# Patient Record
Sex: FEMALE | Race: WHITE
[De-identification: names, ages, dates, MRNs, and addresses within clinical notes are randomized per-mention and may not be internally consistent; named-entity substitution may affect disease eponyms.]

---

## 2020-08-30 ENCOUNTER — HOSPITAL ENCOUNTER (INPATIENT)
Dept: HOSPITAL 74 - JER | Age: 65
LOS: 1 days | Discharge: HOME | DRG: 101 | End: 2020-08-31
Attending: GENERAL ACUTE CARE HOSPITAL | Admitting: INTERNAL MEDICINE
Payer: COMMERCIAL

## 2020-08-30 VITALS — BODY MASS INDEX: 22.6 KG/M2

## 2020-08-30 DIAGNOSIS — I10: ICD-10-CM

## 2020-08-30 DIAGNOSIS — F10.10: ICD-10-CM

## 2020-08-30 DIAGNOSIS — R56.9: Primary | ICD-10-CM

## 2020-08-30 DIAGNOSIS — R55: ICD-10-CM

## 2020-08-30 DIAGNOSIS — D72.829: ICD-10-CM

## 2020-08-30 DIAGNOSIS — E78.5: ICD-10-CM

## 2020-08-30 LAB
ALBUMIN SERPL-MCNC: 3.8 G/DL (ref 3.4–5)
ALP SERPL-CCNC: 54 U/L (ref 45–117)
ALT SERPL-CCNC: 24 U/L (ref 13–61)
AMPHET UR-MCNC: NEGATIVE NG/ML
ANION GAP SERPL CALC-SCNC: 9 MMOL/L (ref 8–16)
APPEARANCE UR: CLEAR
AST SERPL-CCNC: 18 U/L (ref 15–37)
BARBITURATES UR-MCNC: NEGATIVE NG/ML
BENZODIAZ UR SCN-MCNC: NEGATIVE NG/ML
BILIRUB SERPL-MCNC: 0.3 MG/DL (ref 0.2–1)
BILIRUB UR STRIP.AUTO-MCNC: NEGATIVE MG/DL
BUN SERPL-MCNC: 15.2 MG/DL (ref 7–18)
CALCIUM SERPL-MCNC: 8.8 MG/DL (ref 8.5–10.1)
CHLORIDE SERPL-SCNC: 105 MMOL/L (ref 98–107)
CO2 SERPL-SCNC: 25 MMOL/L (ref 21–32)
COCAINE UR-MCNC: NEGATIVE NG/ML
COLOR UR: YELLOW
CREAT SERPL-MCNC: 0.8 MG/DL (ref 0.55–1.3)
DEPRECATED RDW RBC AUTO: 13.7 % (ref 11.6–15.6)
GLUCOSE SERPL-MCNC: 79 MG/DL (ref 74–106)
HCT VFR BLD CALC: 35.5 % (ref 32.4–45.2)
HGB BLD-MCNC: 11.9 GM/DL (ref 10.7–15.3)
KETONES UR QL STRIP: (no result)
LEUKOCYTE ESTERASE UR QL STRIP.AUTO: NEGATIVE
LIPASE SERPL-CCNC: 104 U/L (ref 73–393)
MCH RBC QN AUTO: 30 PG (ref 25.7–33.7)
MCHC RBC AUTO-ENTMCNC: 33.4 G/DL (ref 32–36)
MCV RBC: 89.7 FL (ref 80–96)
METHADONE UR-MCNC: NEGATIVE NG/ML
NITRITE UR QL STRIP: NEGATIVE
OPIATES UR QL SCN: NEGATIVE NG/ML
PCP UR QL SCN: NEGATIVE NG/ML
PH UR: 6 [PH] (ref 5–8)
PLATELET # BLD AUTO: 253 K/MM3 (ref 134–434)
PMV BLD: 8.9 FL (ref 7.5–11.1)
POTASSIUM SERPLBLD-SCNC: 4 MMOL/L (ref 3.5–5.1)
PROT SERPL-MCNC: 7.1 G/DL (ref 6.4–8.2)
PROT UR QL STRIP: NEGATIVE
PROT UR QL STRIP: NEGATIVE
RBC # BLD AUTO: 3.95 M/MM3 (ref 3.6–5.2)
SODIUM SERPL-SCNC: 139 MMOL/L (ref 136–145)
SP GR UR: 1.02 (ref 1.01–1.03)
UROBILINOGEN UR STRIP-MCNC: 0.2 MG/DL (ref 0.2–1)
WBC # BLD AUTO: 11.2 K/MM3 (ref 4–10)

## 2020-08-30 PROCEDURE — U0003 INFECTIOUS AGENT DETECTION BY NUCLEIC ACID (DNA OR RNA); SEVERE ACUTE RESPIRATORY SYNDROME CORONAVIRUS 2 (SARS-COV-2) (CORONAVIRUS DISEASE [COVID-19]), AMPLIFIED PROBE TECHNIQUE, MAKING USE OF HIGH THROUGHPUT TECHNOLOGIES AS DESCRIBED BY CMS-2020-01-R: HCPCS

## 2020-08-30 PROCEDURE — A9579 GAD-BASE MR CONTRAST NOS,1ML: HCPCS

## 2020-08-30 PROCEDURE — G0378 HOSPITAL OBSERVATION PER HR: HCPCS

## 2020-08-30 NOTE — PDOC
Documentation entered by Chelsey Upton SCRIBE, acting as scribe for 

Lali Helton MD.








Lali Helton MD:  This documentation has been prepared by the yoavibeWon Sydney, SCRIBE, under my direction and personally reviewed by me in its 

entirety.  I confirm that the documentation accurately reflects all work, 

treatment, procedures, and medical decision making performed by me.  





Attending Attestation





- Resident


Resident Name: Diogo Ma





- ED Attending Attestation


I have performed the following: I have examined & evaluated the patient, The 

case was reviewed & discussed with the resident, I agree w/resident's findings &

plan, Exceptions are as noted





- HPI


HPI: 


08/30/20 19:57


64 yo F h/o EtOH abuse, HTN, HLD who presents to the ED s/p syncopal vs siezure 

episode. As per patient, she was sitting at her dinner with boyfriend and cousin

when she suddenly went unconscious for an unknown period of time. she states she

was still sitting at the table, did not hit the floor. per EMS, Patients family

reported her hands were shaking during the episode.  She states she threw up 5 

times when regained consciousness she vomited 5 times. now states she feels 

better. denies recent head trauma. no ha. reports 2 glasses of wine today, daily

etoh use. denies h/o withdrawal seizures. denies cp sob or fevers recently. 





additional history per her boyfriend, who states she became unconsiousnes 

lasting around a minute. boyfriend denies any body shaking. was able to hold her

up, and confirms she did not hit the floor. 





Denies past history of seizures or recent change in medications.





Allergies:NKDA


PCP: Dr. Manley


08/30/20 20:23








- Physicial Exam


PE: 





08/30/20 20:27


awake alert NAD head atraumatic. lungs clear bilat heart RRR no mrg abd soft nt 

nd ext wwp. skin warm and dry. alert oriented x 3.  5/5/ all four ext. speech 

clear. 





- Medical Decision Making





08/30/20 20:35


65 y o F h/o etoh abuse, htn hld s/p syncopal episode, questionable seizure 

activity.


differential includes intoxication, electrolyte abnormality dysrhtymia, 

dehydration, seizure, 


plan ct head labs ua cxr, cbc cmp trop ekg





**Heart Score/ECG Review


  ** #1


General ECG Interpretation: Sinus Rhythm, Normal Rate (71), Normal Intervals, No

acute ischemic changes (TWI AVL, V2, V3. no st elevation or depression. 

intervals normal.)





  ** #2


General ECG Interpretation: Sinus Rhythm, Normal Rate (68), Normal Intervals, No

acute ischemic changes





Discharge





- Discharge Information


Problems reviewed: Yes


Clinical Impression/Diagnosis: 


 Syncope and collapse





Condition: Fair





- Follow up/Referral





- Patient Discharge Instructions





- Post Discharge Activity

## 2020-08-30 NOTE — PDOC
History of Present Illness





- General


Chief Complaint: Seizure


Stated Complaint: PT PASSED OUT


Time Seen by Provider: 20 19:24





- History of Present Illness


Initial Comments: 





20 19:44


66yo F h/o everyday EtOH use w/ PMH HTN and HDL presents s/p passing out after 

dinner. She was sitting at the table when she went unconscious. Family states 

her hands started shaking. Unknown how long she was unconscious. Upon waking she

felt "slow, and my vision was hot." She pointed to her temples and stated they 

felt hot. Once she woke up and got up she threw up 5x. She feels "much better 

now." 





Denies past seizures, changes in meds, recent illness, travel, sore throat, 

cough, fever. 








SHx:


Retired  who lives in a house x3yrs w/ her fiance. 


Drinks EtOH everyday (2glasses of wine). negative CAGE exam. 





Past History





- Travel History


Traveled outside of the country in the last 30 days: No





- Medical History


Allergies/Adverse Reactions: 


                                    Allergies











Allergy/AdvReac Type Severity Reaction Status Date / Time


 


No Known Allergies Allergy   Verified 20 19:27











Home Medications: 


Ambulatory Orders





Olmesartan Medoxomil [Benicar (Nf)] 10 mg PO DAILY 20 


Simvastatin 10 mg PO DAILY 20 








Anemia: No


Asthma: No


Cancer: No


Cardiac Disorders: Yes (regurgitation)


Hx Myocardial Infarction: No


CVA: No


COPD: No


CHF: No


DVT: No


Dementia: No


Diabetes: No


Hx Glaucoma: No


Dialysis: No


GI Disorders: No


 Disorders: No


HTN: Yes


Hypercholesterolemia: Yes


HIV: No


Kidney Stones: No


Liver Disease: No


Psychiatric Problems: No


Seizures: No


Thyroid Disease: No


Lung CA: No





**Review of Systems





- Review of Systems


Able to Perform ROS?: Yes


Is the patient limited English proficient: No


Constitutional: Yes: Weight Stable.  No: Chills, Diaphoresis, Fever, Loss of 

Appetite, Unintentional Wgt. Loss


HEENTM: No: Blurred Vision, Recent change in vision, Nose Pain, Tinnitus


Respiratory: No: Cough, Orthopnea, Shortness of Breath, Productive cough


Cardiac (ROS): No: Chest Pain, Edema, Chest Tightness


ABD/GI: No: Constipated, Nausea, Poor Appetite, Vomiting, Abdominal cramping


: No: Burning, Dysuria, Discharge, Frequency


Musculoskeletal: No: Muscle Pain, Muscle Weakness, Neck Pain


Integumentary: No: Dryness, Rash


Neurological: No: Headache, Numbness, Paresthesia, Seizure


Endocrine: No: Unexplained Weight Loss





*Physical Exam





- Physical Exam


General Appearance: Yes: Nourished, Appropriately Dressed, Apparent Distress, 

Alcohol on Breath, Intoxicated


HEENT: positive: REGINALD, Normal ENT Inspection, Normal Voice, Pharynx Normal


Neck: positive: Trachea midline.  negative: Tender, Rigid


Respiratory/Chest: positive: Lungs Clear, Normal Breath Sounds.  negative: Chest

Tender, Respiratory Distress, Accessory Muscle Use


Cardiovascular: positive: Regular Rhythm, Regular Rate, S1, S2.  negative: JVD, 

Murmur


Gastrointestinal/Abdominal: positive: Normal Bowel Sounds, Soft


Musculoskeletal: positive: Normal Inspection.  negative: CVA Tenderness


Extremity: positive: Normal Capillary Refill


Integumentary: positive: Normal Color, Dry, Warm


Neurologic: positive: CNs II-XII NML intact, Fully Oriented, Alert, Normal 

Mood/Affect, Normal Response, Motor Strength 5/5.  negative: Numbness





**Heart Score/ECG Review





- History


History: Slightly suspicious





- Electrocardiogram


EKG: Non specific repolarization disturbance





- Age


Age: >/= 65





- Risk Factors


Risk Factors Heart Score: Yes Hx Hypercholesterolemia, Yes Hx Hypertension, Yes 

Smoking History, Yes Positive family hx of cardiac disease


Based on the list above the patient has:: >/=3 risk factors or Hx 

atherosclerotic disease





- Troponin


Troponin: </= normal limit





- Score


Heart Score - Total: 5





ED Treatment Course





- LABORATORY


CBC & Chemistry Diagram: 


                                 20 21:10





                                 20 21:10





- RADIOLOGY


Radiograph Interpretation: 





20 23:39


THIS IS A PRELIMINARY REPORT


DATE OF SERVICE: 2020 21:07:55


IMAGES: 222


EXAM: HEAD CT WITHOUT CONTRAST


HISTORY: Contiguous axial tomographic sections were obtained from the base of 

the skull to the


vertex without the use of intravenous contrast. Sagittal and coronal reformatted

images are


provided.


COMPARISON: None.


Preliminary findings/impression:


1. No evidence of acute intracranial hemorrhage on this study.


2. Bilateral ethmoid sinus disease.


3. Nasal septal deviation.


4. Atherosclerotic calcifications.





Patient Information:


MRN: [id number]


: 1955


Accession #: [id number]


Order Type: Preliminary


Name: JESSICA HUGHES


Sex: F


Study Description: CT HEAD


Modality: CT


Location: [facility]


Referring Physician: CARLYLE VO


One or more of the following dose reduction techniques were used: automated 

exposure control,


adjustment of the mA and/or kV according to patient size, use of iterative 

reconstructive technique.


THIS DOCUMENT HAS BEEN ELECTRONICALLY SIGNED


Errol Duron MD


2020 21:27 EST





Medical Decision Making





- Medical Decision Making





20 20:26


spoke w/ fiance: unconscious [id number]. never hit the floor. no shaking 

whatsoever. 


20 20:37








Syncope vs. seizure





Discharge





- Discharge Information


Problems reviewed: Yes


Clinical Impression/Diagnosis: 


 Syncope and collapse





Condition: Fair





- Admission


Yes





- Follow up/Referral


Referrals: 


Gomez Cope MD [Primary Care Provider] - 





- Patient Discharge Instructions





- Post Discharge Activity

## 2020-08-30 NOTE — PN
Teaching Attending Note


Name of Resident: Refugio Martinez





ATTENDING PHYSICIAN STATEMENT





I saw and evaluated the patient.


I reviewed the resident's note and discussed the case with the resident.


I agree with the resident's findings and plan as documented.








SUBJECTIVE:


Patient is a 65 year old woman with a PMH of Stress cardiomyopathy (Broken heart

syndrome), Alcohol abuse, HTN and HLD who presents to the ER after a syncopal 

episode. Patient reports she was sitting at her dinner with boyfriend and cousin

when she suddenly went unconscious for an unknown period of time. States she was

still sitting at the table, did not hit the floor. Patients family reported her

hands were shaking during the episode. She states she vomited up 5 times when 

she regained consciousness and had urinary incontinence. Denies recent head 

trauma or headache. Drank 2 glasses of wine today and everyday. 





Denies history of withdrawal seizures or recent change in medications. Patient 

denies chest pain, shortness of breath, abdominal pain, palpitations, dizziness,

fever, chills, diarrhea, constipation, dysuria, frequency, urgency, melena, 

hematochezia or hematuria. Retired  who lives in a house with her 

boyfriend. Denies alcohol, tobacco or illicit drug use. No sick contacts or 

recent travels. Family history of heart attack in mother, "stomach aneurysm" 

(?AAA) and Alzheimer's dementia in father. 





OBJECTIVE:


Alert and not orthostatic


                                   Vital Signs











 Period  Temp  Pulse  Resp  BP Sys/Morales  Pulse Ox


 


 Last 24 Hr  98.2 F  66  18  112/71  97








HEENT: No Jaundice, eye redness or discharge, PERRLA, EOMI. Normocephalic, 

atraumatic. External ears are normal and hearing is grossly intact. No nasal 

discharge.


Neck: Supple, nontender. No palpable adenopathy or thyromegaly. No JVD


Chest: Good effort. Clear to auscultation and percussion.


Heart: Regular. No S3, rub or murmur


Abdomen: Not distended, soft, nontender and no HSM. No rebound or guarding. 

Normal bowel sounds.


Ext: Peripheral pulses intact. No leg edema.


Skin: Warm and dry. No petechiae, rash or ecchymosis.


Neuro: Alert. Oriented x3. CN 2-12 grossly intact. Sensation grossly intact in 

all four extremities and DTR are symmetric.


Psych: Appropriate mood and affect. Good insight.


                              Abnormal Lab Results











  08/30/20 08/30/20 08/30/20





  21:10 21:10 21:10


 


WBC  11.2 H  


 


Urine Ketones   Trace H 


 


U Marijuana (THC) Screen    Positive A*








                                Home Medications











 Medication  Instructions  Recorded


 


Olmesartan Medoxomil [Benicar (Nf)] 10 mg PO DAILY 08/30/20


 


Simvastatin 10 mg PO DAILY 08/30/20








                               Current Medications











Generic Name Dose Route Start Last Admin





  Trade Name Angelique  PRN Reason Stop Dose Admin


 


Enoxaparin Sodium  40 mg  08/31/20 10:00 





  Lovenox -  SQ  





  DAILY LEXII  


 


Folic Acid  1 mg  08/31/20 10:00 





  Folic Acid -  PO  





  DAILY LEXII  


 


Folic Acid 1 mg/ Thiamine HCl  1,000 mls @ 125 mls/hr  08/31/20 01:30  08/31/20 

01:48





100 mg/ Multivitamins/Minerals  IVPB  08/31/20 09:29  125 mls/hr





10 ml/ Sodium Chloride  ONCE ONE   Administration


 


Pantoprazole Sodium  20 mg  08/31/20 10:00 





  Protonix -  PO  





  DAILY LEXII  


 


Thiamine HCl  100 mg  08/31/20 10:00 





  Vitamin B1 -  PO  





  DAILY [facility]  











ASSESSMENT AND PLAN:


1. Syncope/Rule our seizure - Etiology unclear, but complex partial seizures or 

alcohol withdrawal are possibilities. Urine toxicology screen showed marijuana. 

No evidence of acute intracranial pathology on noncontrast head CT scan, but 

showed bilateral ethmoid sinus disease, nasal septal deviation and 

atherosclerotic calcifications. Mild leukocytosis may be due to stress - will 

monitor and get CXR.





EKG shows NSR at 71/minute and QTc 465 with no T wave inversion aVL, V1-V3. No 

old EKG available for comparison. Initial troponin is negative. Viral testing 

for COVID-19 ordered and patient placed on airborne, droplet and contact 

isolation. Will admit to telemetry, treat with IV Protonix, IV NS, trend 

troponin, repeat EKG, get ECHO, EEG, TSH, carotid doppler, fasting lipids, brain

MRI, do speech and swallow evaluation and consult PT/Neurology. Will continue 

comprehensive care for all of patients comorbid conditions.





2. Alcohol abuse  Will implement [facility] alcohol withdrawal protocol and 

do neurochecks. Implement seizure, fall and aspiration precautions. Treat with 

IV Banana bag, thiamine and folic acid. Monitor and replete electrolytes 

(Ca,Mg,K,P). Counseled patient about abstaining from alcohol. Will consult 

Addiction specialist and refer to alcohol detox upon discharge.  





3. Hypertension  Will allow permissive hypertension for 24 to 48 hours. Restart

suitable outpatient antihypertensive drugs when clinically appropriate. 

Subsequently, will revise regimen to ensure round-the-clock excellent BP 

control. Patient counseled on the injurious effects of uncontrolled 

hypertension. Nonpharmacologic measures to control hypertension like weight 

loss, salt restriction and exercise stressed. Importance of adherence to 

treatment regimen and attainment of normotension emphasized. 





4. DVT prophylaxis - Lovenox 40 mg SQ q 24 hours.    





5. Advance directives - Full code

## 2020-08-31 VITALS — HEART RATE: 74 BPM | DIASTOLIC BLOOD PRESSURE: 87 MMHG | SYSTOLIC BLOOD PRESSURE: 171 MMHG | TEMPERATURE: 97.9 F

## 2020-08-31 LAB
ALBUMIN SERPL-MCNC: 3.3 G/DL (ref 3.4–5)
ALP SERPL-CCNC: 45 U/L (ref 45–117)
ALT SERPL-CCNC: 19 U/L (ref 13–61)
ANION GAP SERPL CALC-SCNC: 6 MMOL/L (ref 8–16)
AST SERPL-CCNC: 15 U/L (ref 15–37)
BASOPHILS # BLD: 0.4 % (ref 0–2)
BILIRUB SERPL-MCNC: 0.4 MG/DL (ref 0.2–1)
BUN SERPL-MCNC: 11.2 MG/DL (ref 7–18)
CALCIUM SERPL-MCNC: 8.2 MG/DL (ref 8.5–10.1)
CHLORIDE SERPL-SCNC: 108 MMOL/L (ref 98–107)
CHOLEST SERPL-MCNC: 198 MG/DL (ref 50–200)
CO2 SERPL-SCNC: 25 MMOL/L (ref 21–32)
CREAT SERPL-MCNC: 0.7 MG/DL (ref 0.55–1.3)
DEPRECATED RDW RBC AUTO: 13.7 % (ref 11.6–15.6)
EOSINOPHIL # BLD: 3.1 % (ref 0–4.5)
GLUCOSE SERPL-MCNC: 77 MG/DL (ref 74–106)
HCT VFR BLD CALC: 31 % (ref 32.4–45.2)
HDLC SERPL-MCNC: 101 MG/DL (ref 40–60)
HGB BLD-MCNC: 10.5 GM/DL (ref 10.7–15.3)
LDLC SERPL CALC-MCNC: 87 MG/DL (ref 5–100)
LYMPHOCYTES # BLD: 23.6 % (ref 8–40)
MAGNESIUM SERPL-MCNC: 2.1 MG/DL (ref 1.8–2.4)
MCH RBC QN AUTO: 29.8 PG (ref 25.7–33.7)
MCHC RBC AUTO-ENTMCNC: 33.8 G/DL (ref 32–36)
MCV RBC: 88.4 FL (ref 80–96)
MONOCYTES # BLD AUTO: 10.6 % (ref 3.8–10.2)
NEUTROPHILS # BLD: 62.3 % (ref 42.8–82.8)
PHOSPHATE SERPL-MCNC: 3.2 MG/DL (ref 2.5–4.9)
PLATELET # BLD AUTO: 257 K/MM3 (ref 134–434)
PMV BLD: 7.5 FL (ref 7.5–11.1)
POTASSIUM SERPLBLD-SCNC: 3.8 MMOL/L (ref 3.5–5.1)
PROT SERPL-MCNC: 5.8 G/DL (ref 6.4–8.2)
RBC # BLD AUTO: 3.5 M/MM3 (ref 3.6–5.2)
SODIUM SERPL-SCNC: 139 MMOL/L (ref 136–145)
TRIGL SERPL-MCNC: 87 MG/DL (ref 0–150)
WBC # BLD AUTO: 7 K/MM3 (ref 4–10)

## 2020-08-31 NOTE — PN
Teaching Attending Note


Name of Resident: José Brooke





ATTENDING PHYSICIAN STATEMENT





I saw and evaluated the patient.


I reviewed the resident's note and discussed the case with the resident.


I agree with the resident's findings and plan as documented.








SUBJECTIVE: Feeling better, mild headache. No visual disturbance/limb numbness, 

weakness, tingling. Nausea/vomiting resolved.








OBJECTIVE: Afebrile, Hemodynamically stable.





                                Last Vital Signs











Temp Pulse Resp BP Pulse Ox


 


 98 F   71   18   126/77   95 


 


 08/31/20 07:05  08/31/20 07:05  08/31/20 07:05  08/31/20 07:05  08/31/20 07:05








HEENT - Atraumatic, Normocephalic


Heart - S1, S2, RRR


Lungs - clear to auscultation


Abdomen - Soft, non-tender. Bowel Sounds normal.


Extremities - no edema, no calf tenderness. 


Neuro - AAO x 3. REGINALD. EOMI. Tone/Power normal all extremities.





                         Laboratory Results - last 24 hr











  08/30/20 08/30/20 08/30/20





  21:10 21:10 21:10


 


WBC  11.2 H  


 


RBC  3.95  


 


Hgb  11.9  


 


Hct  35.5  


 


MCV  89.7  


 


MCH  30.0  


 


MCHC  33.4  


 


RDW  13.7  


 


Plt Count  253  


 


MPV  8.9  


 


Absolute Neuts (auto)   


 


Neutrophils %   


 


Lymphocytes %   


 


Monocytes %   


 


Eosinophils %   


 


Basophils %   


 


Nucleated RBC %   


 


Sodium    139


 


Potassium    4.0


 


Chloride    105


 


Carbon Dioxide    25


 


Anion Gap    9


 


BUN    15.2


 


Creatinine    0.8


 


Est GFR (CKD-EPI)AfAm    89.04


 


Est GFR (CKD-EPI)NonAf    76.83


 


Random Glucose    79


 


Calcium    8.8


 


Phosphorus   


 


Magnesium   


 


Total Bilirubin    0.3


 


AST    18


 


ALT    24


 


Alkaline Phosphatase    54


 


Creatine Kinase    66


 


Troponin I    < 0.02


 


Total Protein    7.1


 


Albumin    3.8


 


Triglycerides   


 


Cholesterol   


 


Total LDL Cholesterol   


 


HDL Cholesterol   


 


Lipase    104


 


TSH   


 


Urine Color   Yellow 


 


Urine Appearance   Clear 


 


Urine pH   6.0 


 


Ur Specific Gravity   1.016 


 


Urine Protein   Negative 


 


Urine Glucose (UA)   Negative 


 


Urine Ketones   Trace H 


 


Urine Blood   Negative 


 


Urine Nitrite   Negative 


 


Urine Bilirubin   Negative 


 


Urine Urobilinogen   0.2 


 


Ur Leukocyte Esterase   Negative 


 


Opiates Screen   


 


Methadone Screen   


 


Barbiturate Screen   


 


Phencyclidine Screen   


 


Ur Amphetamines Screen   


 


MDMA (Ecstasy) Screen   


 


Benzodiazepines Screen   


 


Cocaine Screen   


 


U Marijuana (THC) Screen   


 


Alcohol, Quantitative    < 3














  08/30/20 08/30/20 08/31/20





  21:10 21:10 06:55


 


WBC    7.0


 


RBC    3.50 L


 


Hgb    10.5 L


 


Hct    31.0 L


 


MCV    88.4


 


MCH    29.8


 


MCHC    33.8


 


RDW    13.7


 


Plt Count    257


 


MPV    7.5  D


 


Absolute Neuts (auto)    4.4


 


Neutrophils %    62.3


 


Lymphocytes %    23.6


 


Monocytes %    10.6 H


 


Eosinophils %    3.1


 


Basophils %    0.4


 


Nucleated RBC %    0


 


Sodium   


 


Potassium   


 


Chloride   


 


Carbon Dioxide   


 


Anion Gap   


 


BUN   


 


Creatinine   


 


Est GFR (CKD-EPI)AfAm   


 


Est GFR (CKD-EPI)NonAf   


 


Random Glucose   


 


Calcium   


 


Phosphorus   


 


Magnesium   2.1 


 


Total Bilirubin   


 


AST   


 


ALT   


 


Alkaline Phosphatase   


 


Creatine Kinase   


 


Troponin I   


 


Total Protein   


 


Albumin   


 


Triglycerides   


 


Cholesterol   


 


Total LDL Cholesterol   


 


HDL Cholesterol   


 


Lipase   


 


TSH   


 


Urine Color   


 


Urine Appearance   


 


Urine pH   


 


Ur Specific Gravity   


 


Urine Protein   


 


Urine Glucose (UA)   


 


Urine Ketones   


 


Urine Blood   


 


Urine Nitrite   


 


Urine Bilirubin   


 


Urine Urobilinogen   


 


Ur Leukocyte Esterase   


 


Opiates Screen  Negative  


 


Methadone Screen  Negative  


 


Barbiturate Screen  Negative  


 


Phencyclidine Screen  Negative  


 


Ur Amphetamines Screen  Negative  


 


MDMA (Ecstasy) Screen  Negative  


 


Benzodiazepines Screen  Negative  


 


Cocaine Screen  Negative  


 


U Marijuana (THC) Screen  Positive A*  


 


Alcohol, Quantitative   














  08/31/20 08/31/20





  06:55 06:55


 


WBC  


 


RBC  


 


Hgb  


 


Hct  


 


MCV  


 


MCH  


 


MCHC  


 


RDW  


 


Plt Count  


 


MPV  


 


Absolute Neuts (auto)  


 


Neutrophils %  


 


Lymphocytes %  


 


Monocytes %  


 


Eosinophils %  


 


Basophils %  


 


Nucleated RBC %  


 


Sodium  139 


 


Potassium  3.8 


 


Chloride  108 H 


 


Carbon Dioxide  25 


 


Anion Gap  6 L 


 


BUN  11.2 


 


Creatinine  0.7 


 


Est GFR (CKD-EPI)AfAm  104.64 


 


Est GFR (CKD-EPI)NonAf  90.29 


 


Random Glucose  77 


 


Calcium  8.2 L 


 


Phosphorus  3.2 


 


Magnesium  2.1 


 


Total Bilirubin  0.4 


 


AST  15 


 


ALT  19 


 


Alkaline Phosphatase  45 


 


Creatine Kinase  


 


Troponin I   < 0.02


 


Total Protein  5.8 L 


 


Albumin  3.3 L 


 


Triglycerides   87


 


Cholesterol   198


 


Total LDL Cholesterol   87


 


HDL Cholesterol   101 H


 


Lipase  


 


TSH   2.03


 


Urine Color  


 


Urine Appearance  


 


Urine pH  


 


Ur Specific Gravity  


 


Urine Protein  


 


Urine Glucose (UA)  


 


Urine Ketones  


 


Urine Blood  


 


Urine Nitrite  


 


Urine Bilirubin  


 


Urine Urobilinogen  


 


Ur Leukocyte Esterase  


 


Opiates Screen  


 


Methadone Screen  


 


Barbiturate Screen  


 


Phencyclidine Screen  


 


Ur Amphetamines Screen  


 


MDMA (Ecstasy) Screen  


 


Benzodiazepines Screen  


 


Cocaine Screen  


 


U Marijuana (THC) Screen  


 


Alcohol, Quantitative  








                               Current Medications











Generic Name Dose Route Start Last Admin





  Trade Name Freq  PRN Reason Stop Dose Admin


 


Enoxaparin Sodium  40 mg  08/31/20 10:00  08/31/20 10:25





  Lovenox -  SQ   40 mg





  DAILY LEXII   Administration


 


Folic Acid  1 mg  08/31/20 10:00  08/31/20 10:25





  Folic Acid -  PO   1 mg





  DAILY LEXII   Administration


 


Pantoprazole Sodium  20 mg  08/31/20 10:00  08/31/20 10:25





  Protonix -  PO   20 mg





  DAILY LEXII   Administration


 


Thiamine HCl  100 mg  08/31/20 10:00  08/31/20 10:25





  Vitamin B1 -  PO   100 mg





  DAILY LEXII   Administration








                                Home Medications











 Medication  Instructions  Recorded


 


Olmesartan Medoxomil [Benicar (Nf)] 10 mg PO DAILY 08/30/20


 


Simvastatin 10 mg PO DAILY 08/30/20














ASSESSMENT AND PLAN:





65 year old female with history of Takotsubo Cardiomyopathy, HTN, HLD, admitted 

after episode of LOC while sitting at dinner table. No preceding 

CP/palpitations/lightheadedness. Fami;y reports shaking of RUE for . Associated 

urinary incontinence and post-episode nausea/vomiting x 5. No hematemeis. 





1. Syncope suspicious for Seizure, first time event


No preceding symptoms, no HI


CT Head - sinus disease, no acute findings


Orthostatics negative


TSH 2.03


ECG - no acute changes, TropI neg


Carotid Doppler - mild plaque, no hemodynamically significant stenosis.


Echo - small pericardial effusion, no tamponade.


EEG done, result pending.


MRI shows a small hemosiderin focus in the temporal lobe - chronic microbleed vs

hemangioma.


Findings on MRI discussed with Neurology Dr. Alexandra, who recommends starting 

Keppra 500mg BID with Neurosurgery referral on discharge, no need for further 

hospitalization.


Evaluated by Cardio and cleared for discharge.





2. HTN - Continue Olmesartan.





3. HLD - on Statin.





Medically and Neurologically stable for discharge on Keppra 500 BID with 

Neurology and Neurosurgery follow up.

## 2020-08-31 NOTE — CON.CARD
Cardiology Consult (text)





- Consultation


Consultation Note: 





cc: syncope





hpi:  65 f with hx hld, htn, remote episode of Takotsubo cardiomyopathy here 

with syncope.  Pt ate dinner and was sitting at table and family said she 

stopped talking and stared blankly at wall, no LOC, no fall, no slumping over.  

Shortly later pt was alert again and then vomited and then felt ok.  No hx of 

this.  No prodrome sxs.  No cp sob palps dizzy pnd orthopnea le edema.  Active 

with cardio exercise, no anginal sxs.





pmh: per hpi


psh: 


social: no tob


fam: no premature cad, scd


ros: per hpi; all others nl


meds:


                                Home Medications











 Medication  Instructions  Recorded


 


Olmesartan Medoxomil [Benicar -] 10 mg PO DAILY 20


 


Simvastatin 10 mg PO DAILY 20














                                   Vital Signs











 Period  Temp  Pulse  Resp  BP Sys/Morales  Pulse Ox


 


 Last 24 Hr  98 F-98.2 F    12-18  112-156/  95-97








nad no jvd


rrr s1 s2 no mrg


cta bl nl eff


aao3


no le e/c/c


abd nt nd pos bs


no jaundice diaphoresis


pos dp pt no carotid bruits





                             Laboratory Last Values











WBC  7.0 K/mm3 (4.0-10.0)   20  06:55    


 


RBC  3.50 M/mm3 (3.60-5.2)  L  20  06:55    


 


Hgb  10.5 GM/dL (10.7-15.3)  L  20  06:55    


 


Hct  31.0 % (32.4-45.2)  L  20  06:55    


 


MCV  88.4 fl (80-96)   20  06:55    


 


MCH  29.8 pg (25.7-33.7)   20  06:55    


 


MCHC  33.8 g/dl (32.0-36.0)   20  06:55    


 


RDW  13.7 % (11.6-15.6)   20  06:55    


 


Plt Count  257 K/MM3 (134-434)   20  06:55    


 


MPV  7.5 fl (7.5-11.1)  D 20  06:55    


 


Absolute Neuts (auto)  4.4 K/mm3 (1.5-8.0)   20  06:55    


 


Neutrophils %  62.3 % (42.8-82.8)   20  06:55    


 


Lymphocytes %  23.6 % (8-40)   20  06:55    


 


Monocytes %  10.6 % (3.8-10.2)  H  20  06:55    


 


Eosinophils %  3.1 % (0-4.5)   20  06:55    


 


Basophils %  0.4 % (0-2.0)   20  06:55    


 


Nucleated RBC %  0 % (0-0)   20  06:55    


 


Sodium  139 mmol/L (136-145)   20  06:55    


 


Potassium  3.8 mmol/L (3.5-5.1)   20  06:55    


 


Chloride  108 mmol/L ()  H  20  06:55    


 


Carbon Dioxide  25 mmol/L (21-32)   20  06:55    


 


Anion Gap  6 MMOL/L (8-16)  L  20  06:55    


 


BUN  11.2 mg/dL (7-18)   20  06:55    


 


Creatinine  0.7 mg/dL (0.55-1.3)   20  06:55    


 


Est GFR (CKD-EPI)AfAm  104.64   20  06:55    


 


Est GFR (CKD-EPI)NonAf  90.29   20  06:55    


 


Random Glucose  77 mg/dL ()   20  06:55    


 


Calcium  8.2 mg/dL (8.5-10.1)  L  20  06:55    


 


Phosphorus  3.2 mg/dL (2.5-4.9)   20  06:55    


 


Magnesium  2.1 mg/dL (1.8-2.4)   20  06:55    


 


Total Bilirubin  0.4 mg/dL (0.2-1)   20  06:55    


 


AST  15 U/L (15-37)   20  06:55    


 


ALT  19 U/L (13-61)   20  06:55    


 


Alkaline Phosphatase  45 U/L ()   20  06:55    


 


Creatine Kinase  66 U/L ()   20  21:10    


 


Troponin I  < 0.02 ng/ml (0.00-0.05)   20  06:55    


 


Total Protein  5.8 g/dl (6.4-8.2)  L  20  06:55    


 


Albumin  3.3 g/dl (3.4-5.0)  L  20  06:55    


 


Triglycerides  87 mg/dL (0-150)   20  06:55    


 


Cholesterol  198 mg/dL ()   20  06:55    


 


Total LDL Cholesterol  87 mg/dL (5-100)   20  06:55    


 


HDL Cholesterol  101 mg/dL (40-60)  H  20  06:55    


 


Lipase  104 U/L ()   20  21:10    


 


TSH  2.03 uIU/ml (0.358-3.74)   20  06:55    


 


Urine Color  Yellow   20  21:10    


 


Urine Appearance  Clear   20  21:10    


 


Urine pH  6.0  (5.0-8.0)   20  21:10    


 


Ur Specific Gravity  1.016  (1.010-1.035)   20  21:10    


 


Urine Protein  Negative  (NEGATIVE)   20  21:10    


 


Urine Glucose (UA)  Negative  (NEGATIVE)   20  21:10    


 


Urine Ketones  Trace  (NEGATIVE)  H  20  21:10    


 


Urine Blood  Negative  (NEGATIVE)   20  21:10    


 


Urine Nitrite  Negative  (NEGATIVE)   20  21:10    


 


Urine Bilirubin  Negative  (NEGATIVE)   20  21:10    


 


Urine Urobilinogen  0.2 mg/dL (0.2-1.0)   20  21:10    


 


Ur Leukocyte Esterase  Negative  (NEGATIVE)   20  21:10    


 


Opiates Screen  Negative ng/ml (GVBVSJ=937)   20  21:10    


 


Methadone Screen  Negative ng/ml (QFCVGY=197)   20  21:10    


 


Barbiturate Screen  Negative ng/ml (FFGVXA=485)   20  21:10    


 


Phencyclidine Screen  Negative ng/ml (CUTOFF=25)   20  21:10    


 


Ur Amphetamines Screen  Negative ng/ml (PPGUZX=194)   20  21:10    


 


MDMA (Ecstasy) Screen  Negative ng/ml (FTHCMN=562)   20  21:10    


 


Benzodiazepines Screen  Negative ng/ml (IVUAWR=044)   20  21:10    


 


Cocaine Screen  Negative ng/ml (IITRTT=141)   20  21:10    


 


U Marijuana (THC) Screen  Positive ng/ml (CUTOFF=50)  A*  20  21:10    


 


Alcohol, Quantitative  < 3 mg/dL (0.0-5.0)   20  21:10    








ecg: unremarkable





cxr: clear





carotids: no sig stenosis








a/p: 65 f with hx hld, htn, remote episode of Takotsubo cardiomyopathy here with

syncope.





syncope:


-no signs chf, acs, arrhythmia here


-neuro eval in progress


-echo done, results pending, if benign then pt can be dc from cardiac pov and 

f/u with her cardio dr lowe 1-2 weeks.





hld:


-cont statin





htn:


-stable, cont home meds





Takotsubo cardiomyopathy:


-remote history, no signs chf or acs here


-echo pending

## 2020-08-31 NOTE — HP
CHIEF COMPLAINT: loss of consciousness





PCP: Dr. Manley





HISTORY OF PRESENT ILLNESS:


65 year old female patient with past medical history that includes broken heart 

syndrome, HTN, and HLD, who presented to the emergency room after a loss of 

consciousness.  The patient had been eating dinner with her cousin and fiance 

when, per the patient's fiance, the patient suddenly stopped talking and then 

made snoring sounds for about a minute.  The patient reports having loss of 

consciousness during this time, as well as urinary incontinence.  The patient's 

fiance denied seeing the patient shaking, reporting that he only saw her right 

hand ball up into a fist, but he reports that the patient's cousin reported seei

ng the patient's arm shake.  The fiance then tried to get the patient out of her

trance, including trying to "do the heimlech maneuver".  The patient then came 

to and became nauseous and vomited 3 to 5 times, which the patient reports only 

had the stomach contents of her dinner.  The patient then went to the bathroom 

and vomited 2 more times.  The patient then went to the bedroom and vomited 2 

more times.  The patient reports vomiting 2 more times with EMS while coming to 

the ED.





The patient was diagnosed with "having a broken heart" by her Cardiologist Dr. Cope 3 years ago.  Her heart was broken then due to her 's death.  

The patient reports getting an ECHO done, as well as a stress test.  She follows

up with Dr. Cope "for her broken heart".





ER course was notable for:


(1) ECG


(2) Head CT, CXR


(3) Orthostatics (120/81 lying down, 146/76 sitting, 144/82 standing)





Recent Travel: denies





PAST MEDICAL HISTORY:


HTN, HLD, Broken Heart Syndrome





PAST SURGICAL HISTORY:


, Tonsillectomy





Social History:


Smoking: Denies


Alcohol: 2 glasses of wine every night


Drugs: Marijuana





Allergies





No Known Allergies Allergy (Verified 20 19:27)


   








HOME MEDICATIONS:


                                Home Medications











 Medication  Instructions  Recorded


 


Olmesartan Medoxomil [Benicar (Nf)] 10 mg PO DAILY 20


 


Simvastatin 10 mg PO DAILY 20








REVIEW OF SYSTEMS


CONSTITUTIONAL: 


Absent: fever, chills


HEENT: 


Absent: tinnitus


CARDIOVASCULAR: 


Absent: chest pain, palpitations peripheral edema


RESPIRATORY: 


Absent: shortness of breath


GASTROINTESTINAL:


Absent: diarrhea, constipation


GENITOURINARY: 


Absent: dysuria


NEUROLOGIC: 


Absent: headache, dizziness











PHYSICAL EXAMINATION


                               Vital Signs - 24 hr











  20





  19:22 23:22 00:51


 


Temperature 98.2 F  


 


Pulse Rate 66  


 


Pulse Rate [  72 68





Apical]   


 


Respiratory 18 12 12





Rate   


 


Blood Pressure 112/71  


 


Blood Pressure  123/82 118/66





[Left Arm]   


 


O2 Sat by Pulse 97 97 97





Oximetry (%)   











GENERAL: Awake, alert, and fully oriented, in no acute distress.


HEAD: Normal with no signs of trauma.


EYES: Pupils equal, round and reactive to light, extraocular movements intact. 

No lid lag.


EARS, NOSE, THROAT: Ears normal, nares patent, oropharynx clear without 

exudates. Moist mucous membranes.


NECK: Normal range of motion, supple without lymphadenopathy, JVD, or masses.


LUNGS: Breath sounds equal, clear to auscultation bilaterally. No wheezes, and 

no crackles. No accessory muscle use.


HEART: Regular rate and rhythm, normal S1 and S2 without murmur, rub or gallop.


ABDOMEN: Soft, nontender, not distended, normoactive bowel sounds, no guarding, 

no rebound, no masses. 


MUSCULOSKELETAL: Normal range of motion at all joints. No bony deformities or 

tenderness.


UPPER EXTREMITIES: 2+ pulses, warm, well-perfused. No cyanosis. No clubbing. No 

peripheral edema.


LOWER EXTREMITIES: 2+ pulses, warm, well-perfused. No calf tenderness. No 

peripheral edema. 


NEUROLOGICAL:  Cranial nerves II-XII intact. Normal speech. Normal gait. Muscle 

strength +5/5 in upper and lower extremities bilaterally.  Sensation intact in 

upper and lower extremities bilaterally.  DTR's +2/4 in upper and lower 

extremities bilaterally.  Finger-Nose-Finger test negative.


PSYCHIATRIC: Cooperative. Good eye contact. Appropriate mood and affect.


SKIN: Warm, dry, normal turgor, no rashes or lesions noted, normal capillary 

refill. 


                         Laboratory Results - last 24 hr











  20





  21:10 21:10 21:10


 


WBC  11.2 H  


 


RBC  3.95  


 


Hgb  11.9  


 


Hct  35.5  


 


MCV  89.7  


 


MCH  30.0  


 


MCHC  33.4  


 


RDW  13.7  


 


Plt Count  253  


 


MPV  8.9  


 


Sodium    139


 


Potassium    4.0


 


Chloride    105


 


Carbon Dioxide    25


 


Anion Gap    9


 


BUN    15.2


 


Creatinine    0.8


 


Est GFR (CKD-EPI)AfAm    89.04


 


Est GFR (CKD-EPI)NonAf    76.83


 


Random Glucose    79


 


Calcium    8.8


 


Magnesium   


 


Total Bilirubin    0.3


 


AST    18


 


ALT    24


 


Alkaline Phosphatase    54


 


Creatine Kinase    66


 


Troponin I    < 0.02


 


Total Protein    7.1


 


Albumin    3.8


 


Lipase    104


 


Urine Color   Yellow 


 


Urine Appearance   Clear 


 


Urine pH   6.0 


 


Ur Specific Gravity   1.016 


 


Urine Protein   Negative 


 


Urine Glucose (UA)   Negative 


 


Urine Ketones   Trace H 


 


Urine Blood   Negative 


 


Urine Nitrite   Negative 


 


Urine Bilirubin   Negative 


 


Urine Urobilinogen   0.2 


 


Ur Leukocyte Esterase   Negative 


 


Opiates Screen   


 


Methadone Screen   


 


Barbiturate Screen   


 


Phencyclidine Screen   


 


Ur Amphetamines Screen   


 


MDMA (Ecstasy) Screen   


 


Benzodiazepines Screen   


 


Cocaine Screen   


 


U Marijuana (THC) Screen   


 


Alcohol, Quantitative    < 3














  20





  21:10 21:10


 


WBC  


 


RBC  


 


Hgb  


 


Hct  


 


MCV  


 


MCH  


 


MCHC  


 


RDW  


 


Plt Count  


 


MPV  


 


Sodium  


 


Potassium  


 


Chloride  


 


Carbon Dioxide  


 


Anion Gap  


 


BUN  


 


Creatinine  


 


Est GFR (CKD-EPI)AfAm  


 


Est GFR (CKD-EPI)NonAf  


 


Random Glucose  


 


Calcium  


 


Magnesium   2.1


 


Total Bilirubin  


 


AST  


 


ALT  


 


Alkaline Phosphatase  


 


Creatine Kinase  


 


Troponin I  


 


Total Protein  


 


Albumin  


 


Lipase  


 


Urine Color  


 


Urine Appearance  


 


Urine pH  


 


Ur Specific Gravity  


 


Urine Protein  


 


Urine Glucose (UA)  


 


Urine Ketones  


 


Urine Blood  


 


Urine Nitrite  


 


Urine Bilirubin  


 


Urine Urobilinogen  


 


Ur Leukocyte Esterase  


 


Opiates Screen  Negative 


 


Methadone Screen  Negative 


 


Barbiturate Screen  Negative 


 


Phencyclidine Screen  Negative 


 


Ur Amphetamines Screen  Negative 


 


MDMA (Ecstasy) Screen  Negative 


 


Benzodiazepines Screen  Negative 


 


Cocaine Screen  Negative 


 


U Marijuana (THC) Screen  Positive A* 


 


Alcohol, Quantitative  











ASSESSMENT/PLAN:


65 year old female patient with past medical history that includes broken heart 

syndrome, HTN, and HLD, who presented to the emergency room after a loss of 

consciousness.





1. Loss of consciousness secondary to Seizure vs. TIA vs. alternate etiology


- Head CT showed showed bilateral ethmoid sinus disease, nasal septal deviation 

and atherosclerotic calcifications.


- Orthostatics negative


- EEG


- TSH


- EKG


- Carotid Doppler


- MRI


- Fasting lipids


- Neuro consulted





2. HTN


- Permissive hypertension in case patient had a TIA


- Can restart patient on home blood pressure medications when clinically 

appropriate





# FEN - Monitor Electrolytes.  Sodium Controlled Diet (NPO until after fasting 

lipid profile)





DVT PPx - Lovenox SQ








Family Medical History


Family History: As Documented


Family Hx Coronary Artery Disease: Mother ( at 91 from MI)


Other Family History: Father -  at 77 of a "stomah aneurysm" per patient 

(AAA ? ) and had Alzheimer's Disease





Visit type





- Medication Review


Med list reviewed for High Risk Meds patients 65 and older: Yes





- Emergency Visit


Emergency Visit: Yes


ED Registration Date: 20


Care time: The patient presented to the Emergency Department on the above date 

and was hospitalized for further evaluation of their emergent condition.





- New Patient


This patient is new to me today: Yes


Date on this admission: 20





- Critical Care


Critical Care patient: No





ATTENDING PHYSICIAN STATEMENT





I saw and evaluated the patient.


I reviewed the resident's note and discussed the case with the resident.


I agree with the resident's findings and plan as documented.








SUBJECTIVE:








OBJECTIVE:








ASSESSMENT AND PLAN:

## 2020-08-31 NOTE — DS
Physical Exam: 


SUBJECTIVE: 


Patient seen and examined. Denies n/v, headache, CP, SOB. Feels much better.








OBJECTIVE:





                                   Vital Signs











 Period  Temp  Pulse  Resp  BP Sys/Morales  Pulse Ox


 


 Last 24 Hr  97.9 F-98.5 F  66-82  12-18  112-171/66-87  95-99








PHYSICAL EXAM





GENERAL: The patient is awake, alert, and fully oriented, in no acute distress.


HEAD: Normal with no signs of trauma.


EYES: PERRL, extraocular movements intact, sclera anicteric, conjunctiva clear. 


ENT: Ears normal, nares patent, oropharynx clear without exudates, moist mucous 

membranes.


NECK: Trachea midline, full range of motion, supple. 


LUNGS: Breath sounds equal, clear to auscultation bilaterally, no wheezes, no 

crackles, no accessory muscle use. 


HEART: Regular rate and rhythm, S1, S2 without murmur, rub or gallop.


ABDOMEN: Soft, nontender, nondistended, normoactive bowel sounds, no guarding, 

no rebound, no hepatosplenomegaly, no masses.


EXTREMITIES: 2+ pulses, warm, well-perfused, no edema. 


NEUROLOGICAL: Cranial nerves II through XII grossly intact. Normal speech, gait 

not observed.


PSYCH: Normal mood, normal affect.


SKIN: Warm, dry, normal turgor, no rashes or lesions noted.





LABS


                         Laboratory Results - last 24 hr











  08/30/20 08/30/20 08/30/20





  21:10 21:10 21:10


 


WBC  11.2 H  


 


RBC  3.95  


 


Hgb  11.9  


 


Hct  35.5  


 


MCV  89.7  


 


MCH  30.0  


 


MCHC  33.4  


 


RDW  13.7  


 


Plt Count  253  


 


MPV  8.9  


 


Absolute Neuts (auto)   


 


Neutrophils %   


 


Lymphocytes %   


 


Monocytes %   


 


Eosinophils %   


 


Basophils %   


 


Nucleated RBC %   


 


Sodium    139


 


Potassium    4.0


 


Chloride    105


 


Carbon Dioxide    25


 


Anion Gap    9


 


BUN    15.2


 


Creatinine    0.8


 


Est GFR (CKD-EPI)AfAm    89.04


 


Est GFR (CKD-EPI)NonAf    76.83


 


Random Glucose    79


 


Calcium    8.8


 


Phosphorus   


 


Magnesium   


 


Total Bilirubin    0.3


 


AST    18


 


ALT    24


 


Alkaline Phosphatase    54


 


Creatine Kinase    66


 


Troponin I    < 0.02


 


Total Protein    7.1


 


Albumin    3.8


 


Triglycerides   


 


Cholesterol   


 


Total LDL Cholesterol   


 


HDL Cholesterol   


 


Lipase    104


 


TSH   


 


Urine Color   Yellow 


 


Urine Appearance   Clear 


 


Urine pH   6.0 


 


Ur Specific Gravity   1.016 


 


Urine Protein   Negative 


 


Urine Glucose (UA)   Negative 


 


Urine Ketones   Trace H 


 


Urine Blood   Negative 


 


Urine Nitrite   Negative 


 


Urine Bilirubin   Negative 


 


Urine Urobilinogen   0.2 


 


Ur Leukocyte Esterase   Negative 


 


Opiates Screen   


 


Methadone Screen   


 


Barbiturate Screen   


 


Phencyclidine Screen   


 


Ur Amphetamines Screen   


 


MDMA (Ecstasy) Screen   


 


Benzodiazepines Screen   


 


Cocaine Screen   


 


U Marijuana (THC) Screen   


 


Alcohol, Quantitative    < 3














  08/30/20 08/30/20 08/31/20





  21:10 21:10 06:55


 


WBC    7.0


 


RBC    3.50 L


 


Hgb    10.5 L


 


Hct    31.0 L


 


MCV    88.4


 


MCH    29.8


 


MCHC    33.8


 


RDW    13.7


 


Plt Count    257


 


MPV    7.5  D


 


Absolute Neuts (auto)    4.4


 


Neutrophils %    62.3


 


Lymphocytes %    23.6


 


Monocytes %    10.6 H


 


Eosinophils %    3.1


 


Basophils %    0.4


 


Nucleated RBC %    0


 


Sodium   


 


Potassium   


 


Chloride   


 


Carbon Dioxide   


 


Anion Gap   


 


BUN   


 


Creatinine   


 


Est GFR (CKD-EPI)AfAm   


 


Est GFR (CKD-EPI)NonAf   


 


Random Glucose   


 


Calcium   


 


Phosphorus   


 


Magnesium   2.1 


 


Total Bilirubin   


 


AST   


 


ALT   


 


Alkaline Phosphatase   


 


Creatine Kinase   


 


Troponin I   


 


Total Protein   


 


Albumin   


 


Triglycerides   


 


Cholesterol   


 


Total LDL Cholesterol   


 


HDL Cholesterol   


 


Lipase   


 


TSH   


 


Urine Color   


 


Urine Appearance   


 


Urine pH   


 


Ur Specific Gravity   


 


Urine Protein   


 


Urine Glucose (UA)   


 


Urine Ketones   


 


Urine Blood   


 


Urine Nitrite   


 


Urine Bilirubin   


 


Urine Urobilinogen   


 


Ur Leukocyte Esterase   


 


Opiates Screen  Negative  


 


Methadone Screen  Negative  


 


Barbiturate Screen  Negative  


 


Phencyclidine Screen  Negative  


 


Ur Amphetamines Screen  Negative  


 


MDMA (Ecstasy) Screen  Negative  


 


Benzodiazepines Screen  Negative  


 


Cocaine Screen  Negative  


 


U Marijuana (THC) Screen  Positive A*  


 


Alcohol, Quantitative   














  08/31/20 08/31/20





  06:55 06:55


 


WBC  


 


RBC  


 


Hgb  


 


Hct  


 


MCV  


 


MCH  


 


MCHC  


 


RDW  


 


Plt Count  


 


MPV  


 


Absolute Neuts (auto)  


 


Neutrophils %  


 


Lymphocytes %  


 


Monocytes %  


 


Eosinophils %  


 


Basophils %  


 


Nucleated RBC %  


 


Sodium  139 


 


Potassium  3.8 


 


Chloride  108 H 


 


Carbon Dioxide  25 


 


Anion Gap  6 L 


 


BUN  11.2 


 


Creatinine  0.7 


 


Est GFR (CKD-EPI)AfAm  104.64 


 


Est GFR (CKD-EPI)NonAf  90.29 


 


Random Glucose  77 


 


Calcium  8.2 L 


 


Phosphorus  3.2 


 


Magnesium  2.1 


 


Total Bilirubin  0.4 


 


AST  15 


 


ALT  19 


 


Alkaline Phosphatase  45 


 


Creatine Kinase  


 


Troponin I   < 0.02


 


Total Protein  5.8 L 


 


Albumin  3.3 L 


 


Triglycerides   87


 


Cholesterol   198


 


Total LDL Cholesterol   87


 


HDL Cholesterol   101 H


 


Lipase  


 


TSH   2.03


 


Urine Color  


 


Urine Appearance  


 


Urine pH  


 


Ur Specific Gravity  


 


Urine Protein  


 


Urine Glucose (UA)  


 


Urine Ketones  


 


Urine Blood  


 


Urine Nitrite  


 


Urine Bilirubin  


 


Urine Urobilinogen  


 


Ur Leukocyte Esterase  


 


Opiates Screen  


 


Methadone Screen  


 


Barbiturate Screen  


 


Phencyclidine Screen  


 


Ur Amphetamines Screen  


 


MDMA (Ecstasy) Screen  


 


Benzodiazepines Screen  


 


Cocaine Screen  


 


U Marijuana (THC) Screen  


 


Alcohol, Quantitative  











HOSPITAL COURSE:





Date of Admission:08/31/20





Pt came to the ED for episode of syncope/LOC without trauma to the head, urinary

incontinence, followed by nonbilious and nonbloody episodes of nausea/vomiting 

x5, admitted for syncope suspicious for seizure. CT head was negative for bleed,

orthostatic negative, TSH within normal limits, EKG with no acute changes, trop 

negative x3. Carotid Doppler with minimal intimal thickening and no 

hemodynamically significant stenosis. Echo shows EF 60-65% and small pericardial

effusion with no signs of tamponade, MRI brain shows 0.2cm focus in R temporal 

lobe microbleed vs hemangioma. Neuro started keppra for seizure ppx with 

neurosurg follow up EEG to be completed outpatient. Pt was continued on home 

meds for htn and hld. 





Pt will follow up outpatient with neurology (Dr. Alexandra), cardiology (Dr. Andersen), neurosurg and PCP.





Date of Discharge: 08/31/20











Minutes to complete discharge: 36





Discharge Summary


Problems reviewed: Yes


Reason For Visit: SYNCOPE AND COLLAPSE


Current Active Problems





Syncope and collapse (Acute)








Condition: Stable





- Instructions


Diet, Activity, Other Instructions: 


You came to the hospital after losing consciousness at home and suffering a 

possible seizure. You were seen by both a cardiologist and a neurologist while 

you were here. We did imaging of carotid arteries all of which were normal. The 

imaging of your heart showed a tiny amount of fluid around the heart however 

stable, and you will need to have this repeated. We did an MRI of your brain 

which showed a chronic lesion that will need to be followed up with by a 

neurosurgeon for [facility]. evaluation Your symptoms improved and you were stable 

to be discharged home.





Please resume all of your home medications


We are also starting you on a new medication Keppra (for seizure prophylaxis) 

500mg to be taken twice a day 





Please follow up with your primary care physician, Dr Manley within one week


Please follow up with the neurologist, Dr Alexandra upon discharge for review of 

your EEG and further management.


Please follow up with your cardiolgist Dr Cary for outpatient follow up 

within one week to review  your Echocardiogram that shows a small pericardial 

effusion.


Please follow up with neurosurgeon, Dr Mcgovern for further evaluation of the 

hemangioma seen on brain MRI





*if you begin to experience seizures, jerking movements, chest pains, shortness 

of  breath, dizziness,nasuea/vomiting please return to the emergency room 

immediately  





Referrals: 


Adolfo Alexandra MD [Staff Physician] - 1 Week (syncope follow up.)


Lv Manley MD [Non Staff, Medical] - 


Taj Mcgovern MD [Staff Physician] - 1 Week (hemoiderin focus in temporal lobe, 

syncope vs seizure.)


Gomez Cope MD [Primary Care Provider] - 1 Week (Pericardial effusion 

follow up, syncope follow up)


Disposition: HOME





- Home Medications


Comprehensive Discharge Medication List: 


Ambulatory Orders





Olmesartan Medoxomil [Benicar -] 10 mg PO DAILY 08/30/20 


Simvastatin 10 mg PO DAILY 08/30/20 


levETIRAcetam [Keppra -] 500 mg PO BID #60 tablet 08/31/20 








This patient is new to me today: No


Emergency Visit: Yes


ED Registration Date: 08/31/20


Care time: The patient presented to the Emergency Department on the above date 

and was hospitalized for further evaluation of their emergent condition.


Critical Care patient: No





- Discharge Referral


Referred to [facility] No





ATTENDING PHYSICIAN STATEMENT





I saw and evaluated the patient.


I reviewed the resident's note and discussed the case with the resident.


I agree with the resident's findings and plan as documented.








SUBJECTIVE:








OBJECTIVE:








ASSESSMENT AND PLAN:

## 2020-08-31 NOTE — PN
Progress Note (short form)





- Note


Progress Note: 





went over MRI results with Dr Alexandra- patient was found to have a 0.2cm 

hemosiderin focus in R temporal love convcern for chromic microbleed v. 

hemangioma- as per Dr Alexandra started patient on keppra for seizure ppx with 

neurosurgery follow up wiht dr. linette jackson- patient stable for dc home with the 

appropriate follow up

## 2020-08-31 NOTE — EKG
Test Reason : 

Blood Pressure : ***/*** mmHG

Vent. Rate : 068 BPM     Atrial Rate : 068 BPM

   P-R Int : 148 ms          QRS Dur : 078 ms

    QT Int : 418 ms       P-R-T Axes : 075 012 068 degrees

   QTc Int : 444 ms

 

NORMAL SINUS RHYTHM

NONSPECIFIC T WAVE ABNORMALITY

ABNORMAL ECG

WHEN COMPARED WITH ECG OF 30-AUG-2020 21:02,

NO SIGNIFICANT CHANGE WAS FOUND

Confirmed by JOSE MEREDITH MD ([id number]) on 8/31/2020 11:15:54 AM

 

Referred By:             Confirmed By:JOSE MEREDITH MD

## 2020-08-31 NOTE — EKG
Test Reason : 

Blood Pressure : ***/*** mmHG

Vent. Rate : 071 BPM     Atrial Rate : 071 BPM

   P-R Int : 156 ms          QRS Dur : 080 ms

    QT Int : 428 ms       P-R-T Axes : 074 058 067 degrees

   QTc Int : 465 ms

 

NORMAL SINUS RHYTHM

NONSPECIFIC T WAVE ABNORMALITY

ABNORMAL ECG

NO PREVIOUS ECGS AVAILABLE

Confirmed by JOSE MEREDITH MD ([id number]) on 8/31/2020 11:16:09 AM

 

Referred By:             Confirmed By:JOSE MEREDITH MD

## 2020-08-31 NOTE — CON.NEURO
Consult





- Smoking History


Smoking history: Never smoked


Have you smoked in the past 12 months: No





Home Medications





- Allergies


Allergies/Adverse Reactions: 


                                    Allergies











Allergy/AdvReac Type Severity Reaction Status Date / Time


 


No Known Allergies Allergy   Verified 20 19:27














- Home Medications


Home Medications: 


Ambulatory Orders





Olmesartan Medoxomil [Benicar (Nf)] 10 mg PO DAILY 20 


Simvastatin 10 mg PO DAILY 20 











Family Medical History


Family Hx Coronary Artery Disease: Mother ( at 91 from MI)


Other Family History: Father -  at 77 of a "stomah aneurysm" per patient 

(AAA ? ) and had Alzheimer's Disease





Physical Exam-Neuro


Vital Signs: 


                                   Vital Signs











Temperature  98 F   20 07:05


 


Pulse Rate  71   20 07:05


 


Respiratory Rate  18   20 07:05


 


Blood Pressure  126/77   20 07:05


 


O2 Sat by Pulse Oximetry (%)  95   20 07:05











Labs: 


                                    CBC, BMP





                                 20 06:55 





                                 20 06:55 











Assessment/Plan


cc Passing out and hand shaking episode on 


HPI 65 year old female has hitory of  broken heart syndrome, HTN, and HLD,  who 

was having dinner with her fiance and cousin. Pateint has slumped over and 

started making snoring sounds. There was loc and urinary incointnence. Pateint 

was seen shaking her right hand to and fro. . She has two more episode of 

vomiting during that time. 


There was no generalized tonic clonic seizure or toingue bite. Her ct head is 

normal.


She was not orthostatic at arrival. She denies any focal neurological symptoms 

or sickness.


(1) ECG


(2) Head CT, CXR


(3) Orthostatics (120/81 lying down, 146/76 sitting, 144/82 standing)





Recent Travel: denies





PAST MEDICAL HISTORY:


HTN, HLD, Broken Heart Syndrome





PAST SURGICAL HISTORY:


, Tonsillectomy





Social History:


Smoking: Denies


Alcohol: 2 glasses of wine every night


Drugs: Marijuana





Allergies





No Known Allergies Allergy (Verified 20 19:27)


   








HOME MEDICATIONS:


                                Home Medications











 Medication  Instructions  Recorded


 


Olmesartan Medoxomil [Benicar (Nf)] 10 mg PO DAILY 08/30/20


 


Simvastatin 10 mg PO DAILY 20








ROS,FH,SH reviewed in chart





NEUROLOGICAL EXAMINATION


Alert oriented x 3, neck is supple vss, 


speech is normal, eomi, pupils reactive , no face asymmetry moving all ext


sensation is normal


gait and coordination is normal








ct head is unremarkable


being admitted to tele for further cardiac evaluation





Assessment/Plan 65 year old female history of htn, hld came with episode of 

passing out and ? focal seizure like activity. Patinet has normal ct head and 

normal neuro exam.


Most likley this event seems to be syncope but possibility of  of complex 

Partial seizure cant be be ruled out





Plan:


-- no need for AED


- MRI of brain 


- eeg


- seizrue precautions and driving precautions discussed with pateint


- tele monitoring








Thanking you so much


Adolfo Alexandra MD

## 2020-08-31 NOTE — ECHO
______________________________________________________________________________



Name: JESSICA ELLEN D                                 Exam:Adult Echocardiogram

MRN: [id number]         Study Date: 2020 09:52 AM

Age: 65 yrs

______________________________________________________________________________



Height: 61 in        Weight: 120 lb        BSA: 1.5 m2



______________________________________________________________________________



MMode/2D Measurements & Calculations

Ao root diam: 2.3 cm                                  LAV (MOD-bp): 55.0 ml

LA dimension: 3.4 cm

ACS: 1.6 cm



_______________________________________________________

RV S Homer: 8.6 cm/sec



Doppler Measurements & Calculations

MV E max homer: 83.4 cm/sec                               Ao V2 max: 129.3 cm/sec

MV A max homer: 99.2 cm/sec                               Ao max P.7 mmHg

MV E/A: 0.84                                            AI P1/2t: 449.3 msec

MV dec time: 0.12 sec



_________________________________________________________

AI max homer: 399.1 cm/sec                                LV V1 max PG: 3.0 mmHg

AI max P.8 mmHg                                    LV V1 max: 86.9 cm/sec



AI dec slope: 260.2 cm/sec2

_________________________________________________________

PA V2 max: 105.9 cm/sec                                 PI end-d homer: 101.1 cm/sec

PA max P.5 mmHg



_________________________________________________________

Med Peak E' Homer: 6.7 cm/sec

Med E/e': 12.4

Lat Peak E' Homer: 7.3 cm/sec

Lat E/e': 11.4





______________________________________________________________________________

Procedure

A complete two-dimensional transthoracic echocardiogram was performed (2D, M-mode, Doppler and color 
flow

Doppler).

Left Ventricle

The left ventricle is normal in size. Left ventricular systolic function is normal. Ejection Fraction
 = 60-

65%. No regional wall motion abnormalities noted.

Right Ventricle

The right ventricle is normal size. The right ventricular systolic function is normal.

Atria

The left atrial size is normal. Right atrial size is normal.

Mitral Valve

The mitral valve is normal in structure and function. There is mild mitral regurgitation.

Tricuspid Valve

The tricuspid valve is normal in structure and function. There is mild tricuspid regurgitation.

Aortic Valve

The aortic valve is normal in structure and function. Mild aortic regurgitation.

Pulmonic Valve

The pulmonic valve is not well seen, but is grossly normal. Mild pulmonic valvular regurgitation.

Great Vessels

The aortic root is normal size.

Pericardium/Pleura

Small pericardial effusion (<1cm). There are no echocardiographic indications of cardiac tamponade.



______________________________________________________________________________



Interpretation Summary

The left ventricle is normal in size.

Left ventricular systolic function is normal.

No regional wall motion abnormalities noted.

Ejection Fraction = 60-65%.

There is mild mitral regurgitation.

There is mild tricuspid regurgitation.

Mild aortic regurgitation.

Mild pulmonic valvular regurgitation.

Small pericardial effusion (<1cm)

There are no echocardiographic indications of cardiac tamponade.







Estuardo Severino MD 2020 02:54 PM